# Patient Record
Sex: FEMALE | Race: BLACK OR AFRICAN AMERICAN | NOT HISPANIC OR LATINO | Employment: STUDENT | URBAN - METROPOLITAN AREA
[De-identification: names, ages, dates, MRNs, and addresses within clinical notes are randomized per-mention and may not be internally consistent; named-entity substitution may affect disease eponyms.]

---

## 2025-01-03 ENCOUNTER — APPOINTMENT (OUTPATIENT)
Dept: PRIMARY CARE | Facility: CLINIC | Age: 25
End: 2025-01-03

## 2025-01-03 VITALS
SYSTOLIC BLOOD PRESSURE: 126 MMHG | HEART RATE: 86 BPM | DIASTOLIC BLOOD PRESSURE: 82 MMHG | OXYGEN SATURATION: 100 % | WEIGHT: 208 LBS | TEMPERATURE: 97 F | BODY MASS INDEX: 28.61 KG/M2

## 2025-01-03 DIAGNOSIS — G43.709 CHRONIC MIGRAINE WITHOUT AURA WITHOUT STATUS MIGRAINOSUS, NOT INTRACTABLE: ICD-10-CM

## 2025-01-03 DIAGNOSIS — Z01.419 ENCOUNTER FOR GYNECOLOGICAL EXAMINATION: ICD-10-CM

## 2025-01-03 DIAGNOSIS — Z00.00 ENCOUNTER FOR PREVENTIVE HEALTH EXAMINATION: Primary | ICD-10-CM

## 2025-01-03 DIAGNOSIS — Z23 ENCOUNTER FOR VACCINATION: ICD-10-CM

## 2025-01-03 PROCEDURE — 90471 IMMUNIZATION ADMIN: CPT | Performed by: NURSE PRACTITIONER

## 2025-01-03 PROCEDURE — 1036F TOBACCO NON-USER: CPT | Performed by: NURSE PRACTITIONER

## 2025-01-03 PROCEDURE — 99214 OFFICE O/P EST MOD 30 MIN: CPT | Performed by: NURSE PRACTITIONER

## 2025-01-03 PROCEDURE — 90673 RIV3 VACCINE NO PRESERV IM: CPT | Performed by: NURSE PRACTITIONER

## 2025-01-03 ASSESSMENT — PATIENT HEALTH QUESTIONNAIRE - PHQ9
2. FEELING DOWN, DEPRESSED OR HOPELESS: NOT AT ALL
SUM OF ALL RESPONSES TO PHQ9 QUESTIONS 1 AND 2: 0
1. LITTLE INTEREST OR PLEASURE IN DOING THINGS: NOT AT ALL

## 2025-01-03 ASSESSMENT — ENCOUNTER SYMPTOMS
WOUND: 0
WEAKNESS: 0
RESPIRATORY NEGATIVE: 1
HEMATOLOGIC/LYMPHATIC NEGATIVE: 1
LIGHT-HEADEDNESS: 0
CONSTITUTIONAL NEGATIVE: 1
PSYCHIATRIC NEGATIVE: 1
DIZZINESS: 0
EYES NEGATIVE: 1
SLEEP DISTURBANCE: 0
NEUROLOGICAL NEGATIVE: 1
POLYPHAGIA: 0
CONFUSION: 0
NERVOUS/ANXIOUS: 0
FACIAL ASYMMETRY: 0
CARDIOVASCULAR NEGATIVE: 1
ALLERGIC/IMMUNOLOGIC NEGATIVE: 1
MUSCULOSKELETAL NEGATIVE: 1
ENDOCRINE NEGATIVE: 1
GASTROINTESTINAL NEGATIVE: 1

## 2025-01-03 NOTE — PROGRESS NOTES
Subjective   Patient ID: Damián Hawk is a 24 y.o. female who presents for Establish Care (PAP Smear, Mammogram, referral for Neurology for sever headaches bad migraines /).    ADRIANNE Palacio is 23 yo AA female; here to [facility] care, has not had PCP in awhile   Needs GYN for pap testing. Declines need for HIV testing  Non smoker/ non drinker     No medical hx or surgery  hx   Family hx   Grandmother of Breast CA     Needs FLU vaccination     Review of Systems   Constitutional: Negative.    HENT: Negative.     Eyes: Negative.    Respiratory: Negative.     Cardiovascular: Negative.    Gastrointestinal: Negative.    Endocrine: Negative.  Negative for polyphagia.   Genitourinary: Negative.    Musculoskeletal: Negative.    Skin: Negative.  Negative for pallor, rash and wound.   Allergic/Immunologic: Negative.    Neurological: Negative.  Negative for dizziness, facial asymmetry, weakness and light-headedness.   Hematological: Negative.    Psychiatric/Behavioral: Negative.  Negative for confusion, sleep disturbance and suicidal ideas. The patient is not nervous/anxious.    All other systems reviewed and are negative.      Objective   /82   Pulse 86   Temp 36.1 °C (97 °F)   Wt 94.3 kg (208 lb)   SpO2 100%   BMI 28.61 kg/m²     Physical Exam  Vitals and nursing note reviewed.   Constitutional:       Appearance: Normal appearance. She is normal weight.   HENT:      Head: Normocephalic.      Nose: Nose normal.      Mouth/Throat:      Mouth: Mucous membranes are moist.   Eyes:      Extraocular Movements: Extraocular movements intact.      Conjunctiva/sclera: Conjunctivae normal.      Pupils: Pupils are equal, round, and reactive to light.   Cardiovascular:      Rate and Rhythm: Normal rate and regular rhythm.      Pulses: Normal pulses.      Heart sounds: Normal heart sounds.   Pulmonary:      Effort: Pulmonary effort is normal.   Abdominal:      General: Abdomen is flat. Bowel sounds are normal.      Palpations:  Abdomen is soft.   Musculoskeletal:         General: Normal range of motion.      Cervical back: Normal range of motion.   Skin:     General: Skin is warm and dry.   Neurological:      General: No focal deficit present.      Mental Status: She is alert and oriented to person, place, and time.   Psychiatric:         Mood and Affect: Mood normal.         Behavior: Behavior normal.         Assessment/Plan   1. Encounter for preventive health examination (Primary)  - Hemoglobin A1C; Future  - CBC; Future  - Comprehensive Metabolic Panel; Future  - Lipid Panel; Future  - TSH with reflex to Free T4 if abnormal; Future  - Iron and TIBC; Future  - Ferritin; Future    2. Encounter for gynecological examination  - Referral to Gynecology; Future    3. Chronic migraine without aura without status migrainosus, not intractable  - Referral to Neurology; Future    4. Encounter for vaccination  - Flu vaccine, trivalent, preservative free, no egg protein, age 18y+ (Flublok)       FU yearly exam or sooner if problems

## 2025-02-06 ENCOUNTER — OFFICE VISIT (OUTPATIENT)
Dept: NEUROLOGY | Facility: CLINIC | Age: 25
End: 2025-02-06
Payer: COMMERCIAL

## 2025-02-06 VITALS
HEIGHT: 72 IN | DIASTOLIC BLOOD PRESSURE: 70 MMHG | HEART RATE: 72 BPM | SYSTOLIC BLOOD PRESSURE: 136 MMHG | WEIGHT: 200.9 LBS | BODY MASS INDEX: 27.21 KG/M2

## 2025-02-06 DIAGNOSIS — R20.0 NUMBNESS AND TINGLING OF RIGHT FACE: ICD-10-CM

## 2025-02-06 DIAGNOSIS — G43.109 MIGRAINE WITH AURA AND WITHOUT STATUS MIGRAINOSUS, NOT INTRACTABLE: Primary | ICD-10-CM

## 2025-02-06 DIAGNOSIS — R20.2 NUMBNESS AND TINGLING OF RIGHT FACE: ICD-10-CM

## 2025-02-06 PROCEDURE — 99214 OFFICE O/P EST MOD 30 MIN: CPT | Performed by: STUDENT IN AN ORGANIZED HEALTH CARE EDUCATION/TRAINING PROGRAM

## 2025-02-06 PROCEDURE — 99204 OFFICE O/P NEW MOD 45 MIN: CPT | Performed by: STUDENT IN AN ORGANIZED HEALTH CARE EDUCATION/TRAINING PROGRAM

## 2025-02-06 PROCEDURE — 3008F BODY MASS INDEX DOCD: CPT | Performed by: STUDENT IN AN ORGANIZED HEALTH CARE EDUCATION/TRAINING PROGRAM

## 2025-02-06 RX ORDER — NORTRIPTYLINE HYDROCHLORIDE 10 MG/1
CAPSULE ORAL
Qty: 60 CAPSULE | Refills: 2 | Status: SHIPPED | OUTPATIENT
Start: 2025-02-06 | End: 2025-05-21

## 2025-02-06 RX ORDER — RIZATRIPTAN BENZOATE 10 MG/1
10 TABLET, ORALLY DISINTEGRATING ORAL ONCE AS NEEDED
Qty: 9 TABLET | Refills: 2 | Status: SHIPPED | OUTPATIENT
Start: 2025-02-06 | End: 2025-03-08

## 2025-02-06 ASSESSMENT — ENCOUNTER SYMPTOMS
LOSS OF SENSATION IN FEET: 0
DEPRESSION: 0
OCCASIONAL FEELINGS OF UNSTEADINESS: 0

## 2025-02-06 ASSESSMENT — PATIENT HEALTH QUESTIONNAIRE - PHQ9
1. LITTLE INTEREST OR PLEASURE IN DOING THINGS: SEVERAL DAYS
SUM OF ALL RESPONSES TO PHQ9 QUESTIONS 1 AND 2: 1
2. FEELING DOWN, DEPRESSED OR HOPELESS: NOT AT ALL
10. IF YOU CHECKED OFF ANY PROBLEMS, HOW DIFFICULT HAVE THESE PROBLEMS MADE IT FOR YOU TO DO YOUR WORK, TAKE CARE OF THINGS AT HOME, OR GET ALONG WITH OTHER PEOPLE: SOMEWHAT DIFFICULT

## 2025-02-06 NOTE — PATIENT INSTRUCTIONS
You have been prescribed pamelor 10mg tablets with the goal to take 1 tablet at night.    For the first 2 weeks, I would like for you to take 1 tablet per night.  If no side effects or tolerating medication well, increase to 2 tablets (20mg) at night til next appointment.    It can take upwards of 3 months for you to notice any improvement in your headaches.     Most common side effects at this particular dose range include increased appetite/weight gain, dry mouth, and sleepiness. At higher doses, there are more serious side effects possible. If you develop side effects and are not tolerating the medication, please call my office to discuss discontinuation.    When you have a migraine attack, you take 1 tablet of maxalt at the onset. If you still have a headache 2 hours later, you can take a second one. Do not take more than 2 tablets in 1 day.    I have ordered a MRI of your brain    Follow up in 3 months.

## 2025-02-06 NOTE — PROGRESS NOTES
"   [facility]   Referring: Tiana Mayes, APR*  PCP: Tiana Mayes, APRN-CNP  Date of service: 2/6/25    Chief Complaint   Patient presents with    Headache       HISTORY OF PRESENT ILLNESS     Subjective     Damián Hawk is a 24 y.o. female who presents for initial evaluation  of migraines. Her past medical history is significant for asthma.     As a child, Damián first developed headaches. Typical headache consisted of usually unilateral (usually right) throbbing headache associated with nausea, rare vomiting, photo/phonophobia, feeling overheated and blurry vision. No positional component but possibly worsened by valsalva. At that time, she was told that they were likely related to her sinuses and she was trialed on nasal spray but this did not help. Eventually as she went through adolescence these headaches became very infrequent to almost nonexistent.     About 2 years ago, her headaches from before returned but were more persistent and frequent. These are now associated with numbness on the right side of her face and visual disturbances of squiggle/static line in her vision. She denies diplopia, TVO, pulsatile tinnitus, or symptoms involving her arms or legs. For treatment, she will try aspirin or ibuprofen at the onset but this is inconsistently helpful in relieving the pain. She does not take a daily preventative. Episodes occur about 2 days per week, lasting about 4-6 hours but rarely will last all day. Triggers include changes in weather or certain smells but she denies association with her period. At their worst, she needs to lie down because the pain is so bad.     Damián can have a second type of headache (a \"normal\" headache) which consists of a mild, dull forehead pain which is not as bothersome and do not have any migrainous features.     Of note, she thinks she had a seizure as a baby but does not know the circumstances surrounding this event. She is not " currently on any antiseizure medications.       Fhx: migraines (mom)  Shx: denies tobacco, marijuana and heavy alcohol use  Recently graduated from [employer], works as a  for [employer]    There is no problem list on file for this patient.    No past medical history on file.  Past Surgical History:   Procedure Laterality Date    OTHER SURGICAL HISTORY  10/13/2022    No history of surgery     Social History     Tobacco Use    Smoking status: Never     Passive exposure: Never    Smokeless tobacco: Never   Substance Use Topics    Alcohol use: Not on file     family history is not on file.    Current Outpatient Medications   Medication Instructions    BACILLUS COAGULANS-INULIN ORAL 1 capsule, Daily    linaCLOtide (Linzess) 145 mcg capsule 1 capsule, Daily    nortriptyline (Pamelor) 10 mg capsule Take 1 capsule (10 mg) by mouth once daily at bedtime for 14 days, THEN 2 capsules (20 mg) once daily at bedtime.    rizatriptan MLT (MAXALT-MLT) 10 mg, oral, Once as needed, May repeat in 2 hours if unresolved. Do not exceed 30 mg in 24 hours.     Allergies   Allergen Reactions    Nickel Unknown     REDNESS AND SCABS       PHYSICAL EXAM     Objective   Neurological Exam  Physical Exam    Neurologic Exam:    Mental Status:   Memory: intact short term memory   Attention/Concentration: intact attention on bedside test   Fund of knowledge: intact   Orientation: oriented to date location and person   Language: normal fluency comprehension repetition and naming   Speech: is fluent, no dysarthria    Cranial Nerves:  Pupils: OD 4 mm to 3 mm; OS 4 mm to 3 mm (no RAPD)  Visual Fields: (R) visual field full to confrontation; (L) visual field full to confrontation  Optic Discs: (R) disc appears normal; (L) disc appears normal  CN III, CN IV, CN VI (Extraocular movements): extraocular eye movements intact.    CN V:  normal pain and temperature sensation in all three divisions of the trigeminal nerves, bilaterally.  CN VII: normal  facial muscle strength bilaterally  CN VIII: auditory acuity intact to bedside testing  CN IX/CN X: normal palate elevation  CN XI: normal strength of trapezius and SCM muscles, bilaterally.  CN XII: tongue protrudes in the midline, no atrophy or fasciculations    Motor Exam:   Muscle Bulk: No atrophy or fasciculations   Muscle Tone: physiologic tone in upper and lower extremities   Involuntary movements: none  Pronator drift: absent   Strength:  [id number]     [id number]     [id number]    Sensory:   Light touch:intact in all 4 extremities.   Pain:intact in all 4 extremities.   Vibration:intact in all 4 extremities.     Romberg:   Intact without sway.    Reflexes:   RL  B2+2+   T  2+2+  BR 2+2+   P2+2+   A2+2+  ToesDownDown    Coordination:   Normal: Rapid alternating movements are performed with normal speed, amplitude and rhythm; finger to nose and heel-knee-shin movements performed accurately and without dysmetria.     Gait:   Normal Gait and Station: Arises independently; normal posture; gait stable with normal stride length, rate, base and arm swing. Heel, toe tandem gait performed without difficulty.     RESULTS   Data reviewed:  No EEG results found for the past 12 months  No EMG results found for the past 12 months  No MRI head results found for the past 12 months    IMPRESSION AND PLAN   Damián Hawk is a 25 yo woman with PMHx of asthma who presents with several year history of episodic headaches with neurological phenomenon of visual disturbances and right facial numbness. Her neurological exam today is reassuring. By clinical history and exam, these are most likely episodic migraines with aura. These are occurring 8-10 days/month and can affect her daily functioning. Thus will start daily preventative, pamelor. Will also try new rescue medication of maxalt as she does not get complete resolution of her migraine with use of OTC analgesics.   As she went several years without headache and  only recently developed this new type of headache with focal neurological deficit, will need to evaluate further with MRI brain.     Plan:  - prevention: start pamelor 10mg qHS. If tolerating, can increase to 20mg qHS after 2 weeks. Possible side effects discussed  - acute: start maxalt 10mg PRN migraine. Can take a second tablet after 2 hours if no improvement. Do not exceed 3 tablets in 24 hours. Possible side effects discussed.   - MRI brain w/wo  - discussed limiting use of acute treatment medications (triptan, OTC analgesics) to less than 3 days/week to limit chance of causing medication overuse headache  - patient understands and agrees to plan.   - RTC 3 months    Diagnoses and all orders for this visit:  Migraine with aura and without status migrainosus, not intractable  -     Referral to Neurology  -     Follow Up In Neurology; Future  -     nortriptyline (Pamelor) 10 mg capsule; Take 1 capsule (10 mg) by mouth once daily at bedtime for 14 days, THEN 2 capsules (20 mg) once daily at bedtime.  -     rizatriptan MLT (Maxalt-MLT) 10 mg disintegrating tablet; Dissolve 1 tablet (10 mg) in the mouth 1 time if needed for migraine. May repeat in 2 hours if unresolved. Do not exceed 30 mg in 24 hours.  Numbness and tingling of right face  -     MR brain w and wo IV contrast; Future      Patient Instructions   You have been prescribed pamelor 10mg tablets with the goal to take 1 tablet at night.    For the first 2 weeks, I would like for you to take 1 tablet per night.  If no side effects or tolerating medication well, increase to 2 tablets (20mg) at night til next appointment.    It can take upwards of 3 months for you to notice any improvement in your headaches.     Most common side effects at this particular dose range include increased appetite/weight gain, dry mouth, and sleepiness. At higher doses, there are more serious side effects possible. If you develop side effects and are not tolerating the medication,  please call my office to discuss discontinuation.    When you have a migraine attack, you take 1 tablet of maxalt at the onset. If you still have a headache 2 hours later, you can take a second one. Do not take more than 2 tablets in 1 day.    I have ordered a MRI of your brain    Follow up in 3 months.     I personally spent 45 minutes on the day of the visit completing the review of the medical record and outside records, obtaining history and performing an appropriate physical exam, patient care, counseling and education, placing orders, independently reviewing results, communicating with the patient/family and other providers, coordinating care and performing appropriate clinical documentation.    Daria Huitron, DO

## 2025-02-06 NOTE — LETTER
February 7, 2025     IRLANDA Shaikh-CNP  [address] [facility] [address]    Patient: Damián Hawk   YOB: 2000   Date of Visit: 2/6/2025       Dear Dr. Tiana Mayes, IRLANDA-CNP:    Thank you for referring Damián Hawk to me for evaluation. Below are my notes for this consultation.  If you have questions, please do not hesitate to call me. I look forward to following your patient along with you.       Sincerely,     Daria VOGT Below, DO      CC: No Recipients  ______________________________________________________________________________________       [facility]   Referring: Tiana Mayes, APR*  PCP: IVET Shaikh  Date of service: 2/6/25    Chief Complaint   Patient presents with   • Headache       HISTORY OF PRESENT ILLNESS     Subjective    Damián Hawk is a 24 y.o. female who presents for initial evaluation  of migraines. Her past medical history is significant for asthma.     As a child, Damián first developed headaches. Typical headache consisted of usually unilateral (usually right) throbbing headache associated with nausea, rare vomiting, photo/phonophobia, feeling overheated and blurry vision. No positional component but possibly worsened by valsalva. At that time, she was told that they were likely related to her sinuses and she was trialed on nasal spray but this did not help. Eventually as she went through adolescence these headaches became very infrequent to almost nonexistent.     About 2 years ago, her headaches from before returned but were more persistent and frequent. These are now associated with numbness on the right side of her face and visual disturbances of squiggle/static line in her vision. She denies diplopia, TVO, pulsatile tinnitus, or symptoms involving her arms or legs. For treatment, she will try aspirin or ibuprofen at the onset but this is inconsistently helpful in relieving the pain. She does not take a  "daily preventative. Episodes occur about 2 days per week, lasting about 4-6 hours but rarely will last all day. Triggers include changes in weather or certain smells but she denies association with her period. At their worst, she needs to lie down because the pain is so bad.     Damián can have a second type of headache (a \"normal\" headache) which consists of a mild, dull forehead pain which is not as bothersome and do not have any migrainous features.     Of note, she thinks she had a seizure as a baby but does not know the circumstances surrounding this event. She is not currently on any antiseizure medications.       Fhx: migraines (mom)  Shx: denies tobacco, marijuana and heavy alcohol use  Recently graduated from [employer], works as a  for [employer]    There is no problem list on file for this patient.    No past medical history on file.  Past Surgical History:   Procedure Laterality Date   • OTHER SURGICAL HISTORY  10/13/2022    No history of surgery     Social History     Tobacco Use   • Smoking status: Never     Passive exposure: Never   • Smokeless tobacco: Never   Substance Use Topics   • Alcohol use: Not on file     family history is not on file.    Current Outpatient Medications   Medication Instructions   • BACILLUS COAGULANS-INULIN ORAL 1 capsule, Daily   • linaCLOtide (Linzess) 145 mcg capsule 1 capsule, Daily   • nortriptyline (Pamelor) 10 mg capsule Take 1 capsule (10 mg) by mouth once daily at bedtime for 14 days, THEN 2 capsules (20 mg) once daily at bedtime.   • rizatriptan MLT (MAXALT-MLT) 10 mg, oral, Once as needed, May repeat in 2 hours if unresolved. Do not exceed 30 mg in 24 hours.     Allergies   Allergen Reactions   • Nickel Unknown     REDNESS AND SCABS       PHYSICAL EXAM     Objective  Neurological Exam  Physical Exam    Neurologic Exam:    Mental Status:   Memory: intact short term memory   Attention/Concentration: intact attention on bedside test   Fund of knowledge: " intact   Orientation: oriented to date location and person   Language: normal fluency comprehension repetition and naming   Speech: is fluent, no dysarthria    Cranial Nerves:  Pupils: OD 4 mm to 3 mm; OS 4 mm to 3 mm (no RAPD)  Visual Fields: (R) visual field full to confrontation; (L) visual field full to confrontation  Optic Discs: (R) disc appears normal; (L) disc appears normal  CN III, CN IV, CN VI (Extraocular movements): extraocular eye movements intact.    CN V:  normal pain and temperature sensation in all three divisions of the trigeminal nerves, bilaterally.  CN VII: normal facial muscle strength bilaterally  CN VIII: auditory acuity intact to bedside testing  CN IX/CN X: normal palate elevation  CN XI: normal strength of trapezius and SCM muscles, bilaterally.  CN XII: tongue protrudes in the midline, no atrophy or fasciculations    Motor Exam:   Muscle Bulk: No atrophy or fasciculations   Muscle Tone: physiologic tone in upper and lower extremities   Involuntary movements: none  Pronator drift: absent   Strength:  [id number]     [id number]     [id number]    Sensory:   Light touch:intact in all 4 extremities.   Pain:intact in all 4 extremities.   Vibration:intact in all 4 extremities.     Romberg:   Intact without sway.    Reflexes:   RL  B2+2+   T  2+2+  BR 2+2+   P2+2+   A2+2+  ToesDownDown    Coordination:   Normal: Rapid alternating movements are performed with normal speed, amplitude and rhythm; finger to nose and heel-knee-shin movements performed accurately and without dysmetria.     Gait:   Normal Gait and Station: Arises independently; normal posture; gait stable with normal stride length, rate, base and arm swing. Heel, toe tandem gait performed without difficulty.     RESULTS   Data reviewed:  No EEG results found for the past 12 months  No EMG results found for the past 12 months  No MRI head results found for the past 12 months    IMPRESSION AND PLAN   Damián Hawk is a 24  yo woman with PMHx of asthma who presents with several year history of episodic headaches with neurological phenomenon of visual disturbances and right facial numbness. Her neurological exam today is reassuring. By clinical history and exam, these are most likely episodic migraines with aura. These are occurring 8-10 days/month and can affect her daily functioning. Thus will start daily preventative, pamelor. Will also try new rescue medication of maxalt as she does not get complete resolution of her migraine with use of OTC analgesics.   As she went several years without headache and only recently developed this new type of headache with focal neurological deficit, will need to evaluate further with MRI brain.     Plan:  - prevention: start pamelor 10mg qHS. If tolerating, can increase to 20mg qHS after 2 weeks. Possible side effects discussed  - acute: start maxalt 10mg PRN migraine. Can take a second tablet after 2 hours if no improvement. Do not exceed 3 tablets in 24 hours. Possible side effects discussed.   - MRI brain w/wo  - discussed limiting use of acute treatment medications (triptan, OTC analgesics) to less than 3 days/week to limit chance of causing medication overuse headache  - patient understands and agrees to plan.   - RTC 3 months    Diagnoses and all orders for this visit:  Migraine with aura and without status migrainosus, not intractable  -     Referral to Neurology  -     Follow Up In Neurology; Future  -     nortriptyline (Pamelor) 10 mg capsule; Take 1 capsule (10 mg) by mouth once daily at bedtime for 14 days, THEN 2 capsules (20 mg) once daily at bedtime.  -     rizatriptan MLT (Maxalt-MLT) 10 mg disintegrating tablet; Dissolve 1 tablet (10 mg) in the mouth 1 time if needed for migraine. May repeat in 2 hours if unresolved. Do not exceed 30 mg in 24 hours.  Numbness and tingling of right face  -     MR brain w and wo IV contrast; Future      Patient Instructions   You have been prescribed  pamelor 10mg tablets with the goal to take 1 tablet at night.    For the first 2 weeks, I would like for you to take 1 tablet per night.  If no side effects or tolerating medication well, increase to 2 tablets (20mg) at night til next appointment.    It can take upwards of 3 months for you to notice any improvement in your headaches.     Most common side effects at this particular dose range include increased appetite/weight gain, dry mouth, and sleepiness. At higher doses, there are more serious side effects possible. If you develop side effects and are not tolerating the medication, please call my office to discuss discontinuation.    When you have a migraine attack, you take 1 tablet of maxalt at the onset. If you still have a headache 2 hours later, you can take a second one. Do not take more than 2 tablets in 1 day.    I have ordered a MRI of your brain    Follow up in 3 months.     I personally spent 45 minutes on the day of the visit completing the review of the medical record and outside records, obtaining history and performing an appropriate physical exam, patient care, counseling and education, placing orders, independently reviewing results, communicating with the patient/family and other providers, coordinating care and performing appropriate clinical documentation.    Daria Huitron, DO

## 2025-02-10 ENCOUNTER — APPOINTMENT (OUTPATIENT)
Dept: OBSTETRICS AND GYNECOLOGY | Facility: CLINIC | Age: 25
End: 2025-02-10
Payer: COMMERCIAL

## 2025-02-20 ENCOUNTER — APPOINTMENT (OUTPATIENT)
Dept: RADIOLOGY | Facility: HOSPITAL | Age: 25
End: 2025-02-20
Payer: COMMERCIAL

## 2025-02-26 ENCOUNTER — HOSPITAL ENCOUNTER (OUTPATIENT)
Dept: RADIOLOGY | Facility: HOSPITAL | Age: 25
Discharge: HOME | End: 2025-02-26
Payer: COMMERCIAL

## 2025-02-26 DIAGNOSIS — R20.0 NUMBNESS AND TINGLING OF RIGHT FACE: ICD-10-CM

## 2025-02-26 DIAGNOSIS — R20.2 NUMBNESS AND TINGLING OF RIGHT FACE: ICD-10-CM

## 2025-02-26 PROCEDURE — 2550000001 HC RX 255 CONTRASTS: Performed by: STUDENT IN AN ORGANIZED HEALTH CARE EDUCATION/TRAINING PROGRAM

## 2025-02-26 PROCEDURE — A9575 INJ GADOTERATE MEGLUMI 0.1ML: HCPCS | Performed by: STUDENT IN AN ORGANIZED HEALTH CARE EDUCATION/TRAINING PROGRAM

## 2025-02-26 PROCEDURE — 70553 MRI BRAIN STEM W/O & W/DYE: CPT

## 2025-02-26 RX ORDER — GADOTERATE MEGLUMINE 376.9 MG/ML
18 INJECTION INTRAVENOUS
Status: COMPLETED | OUTPATIENT
Start: 2025-02-26 | End: 2025-02-26

## 2025-02-26 RX ADMIN — GADOTERATE MEGLUMINE 18 ML: 376.9 INJECTION INTRAVENOUS at 20:46

## 2025-03-17 ENCOUNTER — APPOINTMENT (OUTPATIENT)
Dept: OBSTETRICS AND GYNECOLOGY | Facility: CLINIC | Age: 25
End: 2025-03-17
Payer: COMMERCIAL

## 2025-04-16 DIAGNOSIS — G43.109 MIGRAINE WITH AURA AND WITHOUT STATUS MIGRAINOSUS, NOT INTRACTABLE: ICD-10-CM

## 2025-04-16 RX ORDER — NORTRIPTYLINE HYDROCHLORIDE 10 MG/1
CAPSULE ORAL
Qty: 60 CAPSULE | Refills: 3 | OUTPATIENT
Start: 2025-04-16 | End: 2025-07-29

## 2025-04-16 RX ORDER — RIZATRIPTAN BENZOATE 10 MG/1
10 TABLET, ORALLY DISINTEGRATING ORAL ONCE AS NEEDED
Qty: 9 TABLET | Refills: 3 | OUTPATIENT
Start: 2025-04-16 | End: 2025-05-16

## 2025-04-16 NOTE — TELEPHONE ENCOUNTER
Per pharmacy the last scripts were given today. Pt has an appt on 5/8 and will get refills at that time.

## 2025-05-01 ENCOUNTER — APPOINTMENT (OUTPATIENT)
Dept: OBSTETRICS AND GYNECOLOGY | Facility: CLINIC | Age: 25
End: 2025-05-01
Payer: COMMERCIAL

## 2025-05-01 VITALS — DIASTOLIC BLOOD PRESSURE: 82 MMHG | SYSTOLIC BLOOD PRESSURE: 135 MMHG | BODY MASS INDEX: 24.94 KG/M2 | WEIGHT: 189 LBS

## 2025-05-01 DIAGNOSIS — Z01.419 ENCOUNTER FOR GYNECOLOGICAL EXAMINATION: ICD-10-CM

## 2025-05-01 DIAGNOSIS — Z01.419 WELL WOMAN EXAM WITH ROUTINE GYNECOLOGICAL EXAM: Primary | ICD-10-CM

## 2025-05-01 PROCEDURE — 99385 PREV VISIT NEW AGE 18-39: CPT | Performed by: NURSE PRACTITIONER

## 2025-05-01 PROCEDURE — 88175 CYTOPATH C/V AUTO FLUID REDO: CPT

## 2025-05-01 PROCEDURE — 1036F TOBACCO NON-USER: CPT | Performed by: NURSE PRACTITIONER

## 2025-05-01 ASSESSMENT — ENCOUNTER SYMPTOMS
LOSS OF SENSATION IN FEET: 0
OCCASIONAL FEELINGS OF UNSTEADINESS: 0
DEPRESSION: 0

## 2025-05-01 NOTE — PROGRESS NOTES
Rhonda Mariano, APRN-CNP     Subjective   Damián Hawk is a 24 y.o. female who presents for annual exam.   24-year-old G0, P0 here today for her first GYN visit.  History reviewed and unremarkable.  She does have history of cancer in a great aunt.    Patient is not sexually active and has not been.  We did discuss attempting to do a Pap smear.  She is agreeable to give this a try  Medical History[1]  Surgical History[2]    OB History          0    Para   0    Term   0       0    AB   0    Living   0         SAB   0    IAB   0    Ectopic   0    Multiple   0    Live Births   0               No LMP recorded.      Review of Systems   All other systems reviewed and are negative.    Breast: No Complaints   Vaginal: No Complaints        Objective   /82   Wt 85.7 kg (189 lb)   BMI 24.94 kg/m²   Physical Exam  Constitutional:       Appearance: Normal appearance. She is normal weight.   Genitourinary:      Vulva and rectum normal.      Right Labia: No rash.     Left Labia: No rash.     No vaginal discharge.      No vaginal prolapse present.     No vaginal atrophy present.       Right Adnexa: no mass present.     Left Adnexa: no mass present.     Cervix is nulliparous.      No cervical motion tenderness.      Uterus is not enlarged.   Breasts:     Right: Normal.      Left: Normal.   Cardiovascular:      Rate and Rhythm: Normal rate.      Heart sounds: Normal heart sounds.   Pulmonary:      Effort: Pulmonary effort is normal.      Breath sounds: Normal breath sounds.   Abdominal:      Palpations: Abdomen is soft.   Musculoskeletal:         General: Normal range of motion.      Cervical back: Normal range of motion.   Neurological:      General: No focal deficit present.      Mental Status: She is alert.   Skin:     General: Skin is warm and dry.   Vitals and nursing note reviewed.                   Assessment/Plan   Problem List Items Addressed This Visit    None  Visit Diagnoses         Codes       Well woman exam with routine gynecological exam    -  Primary Z01.419      Encounter for gynecological examination     Z01.419    Relevant Orders    THINPREP PAP TEST DIAGNOSTIC (21-24)        Encouraged yearly visits or sooner with any problems       [1]   Past Medical History:  Diagnosis Date    Migraine    [2]   Past Surgical History:  Procedure Laterality Date    OTHER SURGICAL HISTORY  10/13/2022    No history of surgery

## 2025-05-08 ENCOUNTER — APPOINTMENT (OUTPATIENT)
Dept: NEUROLOGY | Facility: CLINIC | Age: 25
End: 2025-05-08
Payer: COMMERCIAL

## 2025-05-08 LAB
CYTOLOGY CMNT CVX/VAG CYTO-IMP: NORMAL
LAB AP HPV HR: NORMAL
LABORATORY COMMENT REPORT: NORMAL
PATH REPORT.TOTAL CANCER: NORMAL

## 2025-07-08 ENCOUNTER — APPOINTMENT (OUTPATIENT)
Dept: NEUROLOGY | Facility: CLINIC | Age: 25
End: 2025-07-08
Payer: COMMERCIAL

## 2025-08-01 ENCOUNTER — APPOINTMENT (OUTPATIENT)
Dept: NEUROLOGY | Facility: CLINIC | Age: 25
End: 2025-08-01
Payer: COMMERCIAL